# Patient Record
Sex: MALE
[De-identification: names, ages, dates, MRNs, and addresses within clinical notes are randomized per-mention and may not be internally consistent; named-entity substitution may affect disease eponyms.]

---

## 2018-01-30 ENCOUNTER — HOSPITAL ENCOUNTER (EMERGENCY)
Dept: HOSPITAL 31 - C.ER | Age: 63
Discharge: HOME | End: 2018-01-30
Payer: MEDICAID

## 2018-01-30 VITALS
DIASTOLIC BLOOD PRESSURE: 83 MMHG | HEART RATE: 68 BPM | OXYGEN SATURATION: 98 % | TEMPERATURE: 98.8 F | SYSTOLIC BLOOD PRESSURE: 153 MMHG

## 2018-01-30 VITALS — RESPIRATION RATE: 18 BRPM

## 2018-01-30 VITALS — BODY MASS INDEX: 26.6 KG/M2

## 2018-01-30 DIAGNOSIS — J40: Primary | ICD-10-CM

## 2018-01-30 LAB
ALBUMIN SERPL-MCNC: 4.2 G/DL (ref 3.5–5)
ALBUMIN/GLOB SERPL: 1.2 {RATIO} (ref 1–2.1)
ALT SERPL-CCNC: 10 U/L (ref 21–72)
AST SERPL-CCNC: 17 U/L (ref 17–59)
BACTERIA #/AREA URNS HPF: (no result) /[HPF]
BASOPHILS # BLD AUTO: 0 K/UL (ref 0–0.2)
BASOPHILS NFR BLD: 1.2 % (ref 0–2)
BILIRUB UR-MCNC: NEGATIVE MG/DL
BUN SERPL-MCNC: 17 MG/DL (ref 9–20)
CALCIUM SERPL-MCNC: 9.1 MG/DL (ref 8.6–10.4)
EOSINOPHIL # BLD AUTO: 0 K/UL (ref 0–0.7)
EOSINOPHIL NFR BLD: 0.6 % (ref 0–4)
ERYTHROCYTE [DISTWIDTH] IN BLOOD BY AUTOMATED COUNT: 13.6 % (ref 11.5–14.5)
GFR NON-AFRICAN AMERICAN: 44
GLUCOSE UR STRIP-MCNC: NORMAL MG/DL
HGB BLD-MCNC: 14.2 G/DL (ref 12–18)
HYALINE CASTS #/AREA URNS LPF: (no result) /LPF (ref 0–2)
LEUKOCYTE ESTERASE UR-ACNC: (no result) LEU/UL
LYMPHOCYTES # BLD AUTO: 1.1 K/UL (ref 1–4.3)
LYMPHOCYTES NFR BLD AUTO: 31.3 % (ref 20–40)
MCH RBC QN AUTO: 28.1 PG (ref 27–31)
MCHC RBC AUTO-ENTMCNC: 33.3 G/DL (ref 33–37)
MCV RBC AUTO: 84.2 FL (ref 80–94)
MONOCYTES # BLD: 0.7 K/UL (ref 0–0.8)
MONOCYTES NFR BLD: 20 % (ref 0–10)
NEUTROPHILS # BLD: 1.7 K/UL (ref 1.8–7)
NEUTROPHILS NFR BLD AUTO: 46.9 % (ref 50–75)
NRBC BLD AUTO-RTO: 0 % (ref 0–2)
PH UR STRIP: 5 [PH] (ref 5–8)
PLATELET # BLD: 191 K/UL (ref 130–400)
PMV BLD AUTO: 8 FL (ref 7.2–11.7)
PROT UR STRIP-MCNC: NEGATIVE MG/DL
RBC # BLD AUTO: 5.07 MIL/UL (ref 4.4–5.9)
RBC # UR STRIP: NEGATIVE /UL
SP GR UR STRIP: 1.01 (ref 1–1.03)
URINE NITRATE: NEGATIVE
UROBILINOGEN UR-MCNC: NORMAL MG/DL (ref 0.2–1)
WBC # BLD AUTO: 3.6 K/UL (ref 4.8–10.8)

## 2018-01-30 PROCEDURE — 81001 URINALYSIS AUTO W/SCOPE: CPT

## 2018-01-30 PROCEDURE — 80053 COMPREHEN METABOLIC PANEL: CPT

## 2018-01-30 PROCEDURE — 99284 EMERGENCY DEPT VISIT MOD MDM: CPT

## 2018-01-30 PROCEDURE — 85025 COMPLETE CBC W/AUTO DIFF WBC: CPT

## 2018-01-30 PROCEDURE — 84484 ASSAY OF TROPONIN QUANT: CPT

## 2018-01-30 PROCEDURE — 96374 THER/PROPH/DIAG INJ IV PUSH: CPT

## 2018-01-30 PROCEDURE — 87804 INFLUENZA ASSAY W/OPTIC: CPT

## 2018-01-30 PROCEDURE — 71046 X-RAY EXAM CHEST 2 VIEWS: CPT

## 2018-01-30 NOTE — C.PDOC
History Of Present Illness


63yo male with history of hypertension, high cholesterol, diabetes, presents to 

ED with complaints of headache, productive cough, sore throat and tactile fever 

with associated chills for the past 3 days. Patient states he took Advil for 

his symptoms with mild relief. He reports associated back pain as well. Patient 

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 

couple days ago. Patient states he "never gets sick" and has not had his flu 

shot. He denies any chest pain, shortness of breath, nausea, vomiting, diarrhea

, constipation, vision changes, ear pain. No other complaints.


Time Seen by Provider: 18 16:54


Chief Complaint (Nursing): Flu-like Symptoms


History Per: Patient


History/Exam Limitations: no limitations


Onset/Duration Of Symptoms: Days


Current Symptoms Are (Timing): Still Present


Location Of Pain: Throat, Headache


Sick Contacts (Context): Individual(s) At Work


Associated Symptoms: Fever, Chills, Sore Throat, Cough, Sputum.  denies: Nausea

, Vomiting, Diarrhea





Past Medical History


Reviewed: Historical Data, Nursing Documentation, Vital Signs


Vital Signs: 


 Last Vital Signs











Temp  97.8 F   18 14:25


 


Pulse  88   18 14:25


 


Resp  18   18 14:25


 


BP  143/86   18 14:25


 


Pulse Ox  100   18 18:32














- Medical History


PMH: Diabetes, HTN, Hyperlipidemia


Surgical History: No Surg Hx


Family History: States: No Known Family Hx





- Social History


Hx Alcohol Use: No


Hx Substance Use: No





- Immunization History


Hx Tetanus Toxoid Vaccination: No


Hx Influenza Vaccination: No


Hx Pneumococcal Vaccination: No





Review Of Systems


Except As Marked, All Systems Reviewed And Found Negative.


Constitutional: Positive for: Fever, Chills


Eyes: Negative for: Vision Change


ENT: Negative for: Nose Discharge


Respiratory: Positive for: Cough, Sputum.  Negative for: Shortness of Breath


Gastrointestinal: Negative for: Nausea, Vomiting, Abdominal Pain, Diarrhea, 

Constipation


Neurological: Positive for: Headache





Physical Exam





- Physical Exam


Appears: Non-toxic, No Acute Distress


Skin: Normal Color


Eye(s): bilateral: Normal Inspection, PERRL, EOMI


Ear(s): Bilateral: Normal


Nose: Normal


Oral Mucosa: Moist


Throat: Erythema (mild), No Exudate, No Other (edema)


Neck: Normal ROM, Supple


Chest: Symmetrical


Cardiovascular: Rhythm Regular


Respiratory: Normal Breath Sounds


Gastrointestinal/Abdominal: Normal Exam, Bowel Sounds, Soft, No Tenderness


Back: Normal Inspection


Extremity: Normal ROM


Neurological/Psych: Oriented x3, Normal Speech, Normal Cognition





ED Course And Treatment





- Laboratory Results


Result Diagrams: 


 18 17:43





 18 17:43


ECG: Interpreted By Me, Viewed By Me


ECG Rhythm: Sinus Rhythm


Interpretation Of ECG: LVH, T-Wave inversion in V5 and V6


Rate From EC


O2 Sat by Pulse Oximetry: 100 (RA)


Pulse Ox Interpretation: Normal





Medical Decision Making


Medical Decision Making: 


Impression: Viral illness


Plan:


-- Labs


-- CXR


-- Toradol 30mg IVP


-- EKG





Time: 


CXR as read by provider shows no acute disease.


Kishan reports feeling much better and is stable for discharge home. 





Disposition


Counseled Patient/Family Regarding: Studies Performed, Diagnosis, Need For 

Followup, Rx Given





- Disposition


Referrals: 


[facility] [Outside]


Disposition: HOME/ ROUTINE


Disposition Time: 18:30


Condition: STABLE


Additional Instructions: 


follow up with your doctor or medical clinic in 2 days


call to make an appointment


take medications as prescribed


return to hospital if symptoms worsens or progress 


Prescriptions: 


Azithromycin [Zithromax] 250 mg PO DAILY #4 tab


Benzonatate [Tessalon Perles] 100 mg PO BID PRN #14 sgl


 PRN Reason: Cough


Naproxen [Naprosyn] 500 mg PO BID PRN #16 tab


 PRN Reason: Pain, Moderate (4-7)


Instructions:  Acute Bronchitis (ED)


Forms:  [facility] (English), General Discharge Instructions





- Clinical Impression


Clinical Impression: 


 Bronchitis








- Scribe Statement


The provider has reviewed the documentation as recorded by the Scribe (Wendy Brewer)


Provider Attestation: 


All medical record entries made by the Scribe were at my direction and 

personally dictated by me. I have reviewed the chart and agree that the record 

accurately reflects my personal performance of the history, physical exam, 

medical decision making, and the department course for this patient. I have 

also personally directed, reviewed, and agree with the discharge instructions 

and disposition.

## 2018-01-31 NOTE — RAD
HISTORY:

SOB  



COMPARISON:

No prior.



TECHNIQUE:

Chest PA and lateral



FINDINGS:



LUNGS:

No active pulmonary disease.



PLEURA:

Left basilar pleural thickening. No significant pleural effusion 

identified. No pneumothorax apparent.



CARDIOVASCULAR:

Prior sternotomy with sternal wires and surgical clips.  

Cardiomediastinal silhouette within normal limits.



OSSEOUS STRUCTURES:

Mild degenerative changes.



VISUALIZED UPPER ABDOMEN:

Normal.



OTHER FINDINGS:

None.



IMPRESSION:

No active disease.

## 2018-02-02 NOTE — CARD
--------------- APPROVED REPORT --------------





EKG Measurement

Heart [id number]

CA [id number]

[id number]

[id number]

[id number]



<Conclusion>

Normal sinus rhythm

Moderate voltage criteria for LVH, may be normal variant

Inferior infarct, age undetermined

T wave abnormality, consider lateral ischemia

Abnormal ECG

## 2018-02-16 ENCOUNTER — HOSPITAL ENCOUNTER (EMERGENCY)
Age: 63
Discharge: HOME | End: 2018-02-16
Payer: MEDICAID

## 2018-02-16 ENCOUNTER — APPOINTMENT (OUTPATIENT)
Dept: GENERAL RADIOLOGY | Age: 63
End: 2018-02-16
Payer: MEDICAID

## 2018-02-16 VITALS
OXYGEN SATURATION: 96 % | TEMPERATURE: 98.7 F | DIASTOLIC BLOOD PRESSURE: 88 MMHG | HEART RATE: 77 BPM | SYSTOLIC BLOOD PRESSURE: 145 MMHG | HEIGHT: 69 IN | WEIGHT: 177 LBS | RESPIRATION RATE: 18 BRPM | BODY MASS INDEX: 26.22 KG/M2

## 2018-02-16 DIAGNOSIS — E11.65 TYPE 2 DIABETES MELLITUS WITH HYPERGLYCEMIA, WITHOUT LONG-TERM CURRENT USE OF INSULIN (HCC): ICD-10-CM

## 2018-02-16 DIAGNOSIS — J11.1 INFLUENZA: Primary | ICD-10-CM

## 2018-02-16 DIAGNOSIS — N18.9 CHRONIC RENAL IMPAIRMENT, UNSPECIFIED CKD STAGE: ICD-10-CM

## 2018-02-16 LAB
ALBUMIN SERPL-MCNC: 3.5 G/DL (ref 3.5–5.1)
ALP BLD-CCNC: 48 U/L (ref 38–126)
ALT SERPL-CCNC: 7 U/L (ref 11–66)
ANION GAP SERPL CALCULATED.3IONS-SCNC: 13 MEQ/L (ref 8–16)
AST SERPL-CCNC: 13 U/L (ref 5–40)
BILIRUB SERPL-MCNC: 0.2 MG/DL (ref 0.3–1.2)
BUN BLDV-MCNC: 16 MG/DL (ref 7–22)
CALCIUM SERPL-MCNC: 9 MG/DL (ref 8.5–10.5)
CHLORIDE BLD-SCNC: 97 MEQ/L (ref 98–111)
CO2: 27 MEQ/L (ref 23–33)
CREAT SERPL-MCNC: 1.8 MG/DL (ref 0.4–1.2)
FLU A ANTIGEN: POSITIVE
FLU B ANTIGEN: NEGATIVE
GFR SERPL CREATININE-BSD FRML MDRD: 46 ML/MIN/1.73M2
GLUCOSE BLD-MCNC: 250 MG/DL (ref 70–108)
LIPASE: 38.4 U/L (ref 5.6–51.3)
OSMOLALITY CALCULATION: 283.4 MOSMOL/KG (ref 275–300)
POTASSIUM SERPL-SCNC: 4.4 MEQ/L (ref 3.5–5.2)
SCAN OF BLOOD SMEAR: NORMAL
SODIUM BLD-SCNC: 137 MEQ/L (ref 135–145)
TOTAL PROTEIN: 6.9 G/DL (ref 6.1–8)

## 2018-02-16 PROCEDURE — 83690 ASSAY OF LIPASE: CPT

## 2018-02-16 PROCEDURE — 36415 COLL VENOUS BLD VENIPUNCTURE: CPT

## 2018-02-16 PROCEDURE — 80053 COMPREHEN METABOLIC PANEL: CPT

## 2018-02-16 PROCEDURE — 85025 COMPLETE CBC W/AUTO DIFF WBC: CPT

## 2018-02-16 PROCEDURE — 6370000000 HC RX 637 (ALT 250 FOR IP): Performed by: NURSE PRACTITIONER

## 2018-02-16 PROCEDURE — 71046 X-RAY EXAM CHEST 2 VIEWS: CPT

## 2018-02-16 PROCEDURE — 99284 EMERGENCY DEPT VISIT MOD MDM: CPT

## 2018-02-16 PROCEDURE — 87804 INFLUENZA ASSAY W/OPTIC: CPT

## 2018-02-16 RX ORDER — BENZONATATE 100 MG/1
100 CAPSULE ORAL 3 TIMES DAILY PRN
Qty: 30 CAPSULE | Refills: 0 | Status: SHIPPED | OUTPATIENT
Start: 2018-02-16 | End: 2018-02-23

## 2018-02-16 RX ORDER — IBUPROFEN 200 MG
400 TABLET ORAL ONCE
Status: COMPLETED | OUTPATIENT
Start: 2018-02-16 | End: 2018-02-16

## 2018-02-16 RX ORDER — BENZONATATE 100 MG/1
100 CAPSULE ORAL ONCE
Status: COMPLETED | OUTPATIENT
Start: 2018-02-16 | End: 2018-02-16

## 2018-02-16 RX ORDER — LOPERAMIDE HYDROCHLORIDE 2 MG/1
2 CAPSULE ORAL 4 TIMES DAILY PRN
Qty: 20 CAPSULE | Refills: 0 | Status: SHIPPED | OUTPATIENT
Start: 2018-02-16 | End: 2018-02-26

## 2018-02-16 RX ORDER — 0.9 % SODIUM CHLORIDE 0.9 %
1000 INTRAVENOUS SOLUTION INTRAVENOUS ONCE
Status: DISCONTINUED | OUTPATIENT
Start: 2018-02-16 | End: 2018-02-16 | Stop reason: HOSPADM

## 2018-02-16 RX ADMIN — IBUPROFEN 400 MG: 200 TABLET, FILM COATED ORAL at 14:42

## 2018-02-16 RX ADMIN — BENZONATATE 100 MG: 100 CAPSULE ORAL at 14:42

## 2018-02-16 ASSESSMENT — PAIN SCALES - GENERAL
PAINLEVEL_OUTOF10: 8
PAINLEVEL_OUTOF10: 8

## 2018-02-16 ASSESSMENT — ENCOUNTER SYMPTOMS
COUGH: 1
EYE PAIN: 1
BLOOD IN STOOL: 0
EYE REDNESS: 0
CHEST TIGHTNESS: 0
CONSTIPATION: 0
DIARRHEA: 1
VOMITING: 0
BACK PAIN: 0
SHORTNESS OF BREATH: 0
WHEEZING: 0
ABDOMINAL DISTENTION: 0
RHINORRHEA: 0
ABDOMINAL PAIN: 0
NAUSEA: 0
PHOTOPHOBIA: 0
VOICE CHANGE: 0
COLOR CHANGE: 0
SORE THROAT: 0
SINUS PRESSURE: 0

## 2018-02-16 ASSESSMENT — PAIN DESCRIPTION - DESCRIPTORS: DESCRIPTORS: SHARP

## 2018-02-16 ASSESSMENT — PAIN DESCRIPTION - PAIN TYPE: TYPE: ACUTE PAIN

## 2018-02-16 ASSESSMENT — PAIN DESCRIPTION - LOCATION: LOCATION: HEAD

## 2018-02-16 NOTE — ED NOTES
Patient presents to ED with complaint of fever, chills and headache for past few days. Patient states he took Aspirin for fever. Patient states eyes hurt with headache, room lights dimmed. Patient cough noted nonproductive. Respirations regular and unlabored. Patient further complains of some diarrhea, denies nausea or vomiting.      Cintia Bradley RN  02/16/18 [id number]

## 2018-02-17 LAB
ATYPICAL LYMPHOCYTES: ABNORMAL %
BASOPHILS # BLD: 1.3 %
BASOPHILS ABSOLUTE: 0 THOU/MM3 (ref 0–0.1)
EOSINOPHIL # BLD: 0 %
EOSINOPHILS ABSOLUTE: 0 THOU/MM3 (ref 0–0.4)
HCT VFR BLD CALC: 41.4 % (ref 42–52)
HEMOGLOBIN: 13.6 GM/DL (ref 14–18)
LYMPHOCYTES # BLD: 35.7 %
LYMPHOCYTES ABSOLUTE: 1.1 THOU/MM3 (ref 1–4.8)
MCH RBC QN AUTO: 27.7 PG (ref 27–31)
MCHC RBC AUTO-ENTMCNC: 32.8 GM/DL (ref 33–37)
MCV RBC AUTO: 84.4 FL (ref 80–94)
MONOCYTES # BLD: 25.7 %
MONOCYTES ABSOLUTE: 0.8 THOU/MM3 (ref 0.4–1.3)
NUCLEATED RED BLOOD CELLS: 0 /100 WBC
PATHOLOGIST REVIEW: ABNORMAL
PDW BLD-RTO: 13.3 % (ref 11.5–14.5)
PLATELET # BLD: 216 THOU/MM3 (ref 130–400)
PLATELET ESTIMATE: ADEQUATE
PMV BLD AUTO: 7.6 FL (ref 7.4–10.4)
RBC # BLD: 4.91 MILL/MM3 (ref 4.7–6.1)
SEG NEUTROPHILS: 37.3 %
SEGMENTED NEUTROPHILS ABSOLUTE COUNT: 1.2 THOU/MM3 (ref 1.8–7.7)
WBC # BLD: 3.1 THOU/MM3 (ref 4.8–10.8)

## 2018-02-17 NOTE — ED PROVIDER NOTES
non-plain film images(s) such as CT, Ultrasound and MRI are read by the radiologist.  Plain radiographic images are visualized and preliminarily interpreted by the emergency physician unless otherwise stated below. XR CHEST STANDARD (2 VW)   Final Result      No acute cardiopulmonary disease. **This report has been created using voice recognition software. It may contain minor errors which are inherent in voice recognition technology. **      Final report electronically signed by Dr. Sanjuana Brooks on 2/16/2018 3:08 PM            LABS:   Labs Reviewed   RAPID INFLUENZA A/B ANTIGENS - Abnormal; Notable for the following:        Result Value    Flu A Antigen POSITIVE (*)     All other components within normal limits   CBC WITH AUTO DIFFERENTIAL - Abnormal; Notable for the following:     WBC 3.1 (*)     Hemoglobin 13.6 (*)     Hematocrit 41.4 (*)     MCHC 32.8 (*)     Segs Absolute 1.2 (*)     All other components within normal limits   COMPREHENSIVE METABOLIC PANEL - Abnormal; Notable for the following:     Glucose 250 (*)     CREATININE 1.8 (*)     Chloride 97 (*)     Total Bilirubin 0.2 (*)     ALT 7 (*)     All other components within normal limits   GLOMERULAR FILTRATION RATE, ESTIMATED - Abnormal; Notable for the following:     Est, Glom Filt Rate 46 (*)     All other components within normal limits   LIPASE   ANION GAP   OSMOLALITY   SCAN OF BLOOD SMEAR       EMERGENCY DEPARTMENT COURSE:   Vitals:    Vitals:    02/16/18 1405   BP: (!) 145/88   Pulse: 77   Resp: 18   Temp: 98.7 °F (37.1 °C)   TempSrc: Oral   SpO2: 96%   Weight: 177 lb (80.3 kg)   Height: 5' 9\" (1.753 m)       MDM    Patient was seen and evaluated in the emergency department, patient appeared to be no acute distress upon my initial evaluation. Physical exam revealed no significant abnormalities. Vital signs were reviewed, slight hypertension was noted, no tachycardia or tachypnea noted. Labs are obtained, no leukocytosis is noted. Electrolytes are within normal limits, creatinine is slightly elevated for this patient. He has a history of chronic renal insufficiency. Glucose is slightly elevated as well. Influenza a is positive. I discussed my findings my plan of care the patient is amenable with discharge. We attempted to give the patient some IV fluids to help with his renal insufficiency likely caused from diarrhea. He refused this. While here in the emergency room he did maintained stable course is appropriate for discharge. He was discharged with medicines to aid and his symptomatic relief. He verbalized understanding of plan of care. Medications   benzonatate (TESSALON) capsule 100 mg (100 mg Oral Given 2/16/18 1442)   ibuprofen (ADVIL;MOTRIN) tablet 400 mg (400 mg Oral Given 2/16/18 1442)       Patient was seen independently by myself. The patient's final impression and disposition and plan was determined by myself. CRITICAL CARE:   None    CONSULTS:  None    PROCEDURES:  None    FINAL IMPRESSION      1. Influenza    2. Chronic renal impairment, unspecified CKD stage    3.  Type 2 diabetes mellitus with hyperglycemia, without long-term current use of insulin [facility]          DISPOSITION/PLAN   Patient discharged in stable condition    PATIENT REFERRED TO:  Your [facility] [address] [facility]    Call in 3 days  For follow up and evaluation      DISCHARGE MEDICATIONS:  Discharge Medication List as of 2/16/2018  4:06 PM      START taking these medications    Details   loperamide (RA ANTI-DIARRHEAL) 2 MG capsule Take 1 capsule by mouth 4 times daily as needed for Diarrhea, Disp-20 capsule, R-0Print      benzonatate (TESSALON PERLES) 100 MG capsule Take 1 capsule by mouth 3 times daily as needed for Cough, Disp-30 capsule, R-0Print             (Please note that portions of this note were completed with a voice recognition program.  Efforts were made to edit the dictations but occasionally words are mis-transcribed.)    Provider:  I personally

## 2018-11-08 ENCOUNTER — HOSPITAL ENCOUNTER (INPATIENT)
Age: 63
LOS: 7 days | Discharge: HOME | DRG: 751 | End: 2018-11-15
Attending: FAMILY MEDICINE | Admitting: PSYCHIATRY & NEUROLOGY
Payer: MEDICAID

## 2018-11-08 DIAGNOSIS — R45.850 HOMICIDAL IDEATIONS: ICD-10-CM

## 2018-11-08 DIAGNOSIS — F43.20 ADJUSTMENT DISORDER, UNSPECIFIED TYPE: Primary | ICD-10-CM

## 2018-11-08 LAB
ACETAMINOPHEN LEVEL: < 5 UG/ML (ref 0–20)
AMPHETAMINE+METHAMPHETAMINE URINE SCREEN: NEGATIVE
ANION GAP SERPL CALCULATED.3IONS-SCNC: 12 MEQ/L (ref 8–16)
BACTERIA: ABNORMAL
BARBITURATE QUANTITATIVE URINE: NEGATIVE
BASOPHILS # BLD: 0.4 %
BASOPHILS ABSOLUTE: 0 THOU/MM3 (ref 0–0.1)
BENZODIAZEPINE QUANTITATIVE URINE: NEGATIVE
BILIRUBIN URINE: ABNORMAL
BLOOD, URINE: NEGATIVE
BUN BLDV-MCNC: 13 MG/DL (ref 7–22)
CALCIUM SERPL-MCNC: 9.1 MG/DL (ref 8.5–10.5)
CANNABINOID QUANTITATIVE URINE: NEGATIVE
CASTS: ABNORMAL /LPF
CASTS: ABNORMAL /LPF
CHARACTER, URINE: CLEAR
CHLORIDE BLD-SCNC: 102 MEQ/L (ref 98–111)
CO2: 25 MEQ/L (ref 23–33)
COCAINE METABOLITE QUANTITATIVE URINE: POSITIVE
COLOR: YELLOW
CREAT SERPL-MCNC: 1.3 MG/DL (ref 0.4–1.2)
CRYSTALS: ABNORMAL
EOSINOPHIL # BLD: 2.7 %
EOSINOPHILS ABSOLUTE: 0.2 THOU/MM3 (ref 0–0.4)
EPITHELIAL CELLS, UA: ABNORMAL /HPF
ERYTHROCYTE [DISTWIDTH] IN BLOOD BY AUTOMATED COUNT: 12.7 % (ref 11.5–14.5)
ERYTHROCYTE [DISTWIDTH] IN BLOOD BY AUTOMATED COUNT: 39.7 FL (ref 35–45)
ETHYL ALCOHOL, SERUM: < 0.01 %
GFR SERPL CREATININE-BSD FRML MDRD: 67 ML/MIN/1.73M2
GLUCOSE BLD-MCNC: 229 MG/DL (ref 70–108)
GLUCOSE, URINE: 100 MG/DL
HCT VFR BLD CALC: 43.6 % (ref 42–52)
HEMOGLOBIN: 14.1 GM/DL (ref 14–18)
ICTOTEST: NEGATIVE
IMMATURE GRANS (ABS): 0.01 THOU/MM3 (ref 0–0.07)
IMMATURE GRANULOCYTES: 0.2 %
KETONES, URINE: NEGATIVE
LEUKOCYTE ESTERASE, URINE: NEGATIVE
LYMPHOCYTES # BLD: 29.7 %
LYMPHOCYTES ABSOLUTE: 1.7 THOU/MM3 (ref 1–4.8)
MCH RBC QN AUTO: 27.7 PG (ref 26–33)
MCHC RBC AUTO-ENTMCNC: 32.3 GM/DL (ref 32.2–35.5)
MCV RBC AUTO: 85.7 FL (ref 80–94)
MISCELLANEOUS LAB TEST RESULT: ABNORMAL
MONOCYTES # BLD: 8.3 %
MONOCYTES ABSOLUTE: 0.5 THOU/MM3 (ref 0.4–1.3)
NITRITE, URINE: NEGATIVE
NUCLEATED RED BLOOD CELLS: 0 /100 WBC
OPIATES, URINE: NEGATIVE
OSMOLALITY CALCULATION: 284.9 MOSMOL/KG (ref 275–300)
OXYCODONE: NEGATIVE
PH UA: 5.5
PHENCYCLIDINE QUANTITATIVE URINE: NEGATIVE
PLATELET # BLD: 219 THOU/MM3 (ref 130–400)
PMV BLD AUTO: 9.6 FL (ref 9.4–12.4)
POTASSIUM SERPL-SCNC: 4 MEQ/L (ref 3.5–5.2)
PROTEIN UA: ABNORMAL MG/DL
RBC # BLD: 5.09 MILL/MM3 (ref 4.7–6.1)
RBC URINE: ABNORMAL /HPF
RENAL EPITHELIAL, UA: ABNORMAL
SALICYLATE, SERUM: < 0.3 MG/DL (ref 2–10)
SEG NEUTROPHILS: 58.7 %
SEGMENTED NEUTROPHILS ABSOLUTE COUNT: 3.3 THOU/MM3 (ref 1.8–7.7)
SODIUM BLD-SCNC: 139 MEQ/L (ref 135–145)
SPECIFIC GRAVITY UA: 1.02 (ref 1–1.03)
T4 FREE: 1.2 NG/DL (ref 0.93–1.76)
TSH SERPL DL<=0.05 MIU/L-ACNC: 0.12 UIU/ML (ref 0.4–4.2)
UROBILINOGEN, URINE: 1 EU/DL
WBC # BLD: 5.6 THOU/MM3 (ref 4.8–10.8)
WBC UA: ABNORMAL /HPF
YEAST: ABNORMAL

## 2018-11-08 PROCEDURE — 85025 COMPLETE CBC W/AUTO DIFF WBC: CPT

## 2018-11-08 PROCEDURE — 84443 ASSAY THYROID STIM HORMONE: CPT

## 2018-11-08 PROCEDURE — 84439 ASSAY OF FREE THYROXINE: CPT

## 2018-11-08 PROCEDURE — G0480 DRUG TEST DEF 1-7 CLASSES: HCPCS

## 2018-11-08 PROCEDURE — 99285 EMERGENCY DEPT VISIT HI MDM: CPT

## 2018-11-08 PROCEDURE — 36415 COLL VENOUS BLD VENIPUNCTURE: CPT

## 2018-11-08 PROCEDURE — 1240000000 HC EMOTIONAL WELLNESS R&B

## 2018-11-08 PROCEDURE — 81001 URINALYSIS AUTO W/SCOPE: CPT

## 2018-11-08 PROCEDURE — 80048 BASIC METABOLIC PNL TOTAL CA: CPT

## 2018-11-08 PROCEDURE — 80307 DRUG TEST PRSMV CHEM ANLYZR: CPT

## 2018-11-08 RX ORDER — HYDROXYZINE PAMOATE 50 MG/1
50 CAPSULE ORAL 3 TIMES DAILY PRN
Status: DISCONTINUED | OUTPATIENT
Start: 2018-11-08 | End: 2018-11-16 | Stop reason: HOSPADM

## 2018-11-08 RX ORDER — ACETAMINOPHEN 325 MG/1
650 TABLET ORAL EVERY 4 HOURS PRN
Status: DISCONTINUED | OUTPATIENT
Start: 2018-11-08 | End: 2018-11-16 | Stop reason: HOSPADM

## 2018-11-08 RX ORDER — TRAZODONE HYDROCHLORIDE 50 MG/1
50 TABLET ORAL NIGHTLY PRN
Status: DISCONTINUED | OUTPATIENT
Start: 2018-11-08 | End: 2018-11-12

## 2018-11-08 RX ORDER — IBUPROFEN 400 MG/1
400 TABLET ORAL EVERY 6 HOURS PRN
Status: DISCONTINUED | OUTPATIENT
Start: 2018-11-08 | End: 2018-11-16 | Stop reason: HOSPADM

## 2018-11-08 ASSESSMENT — ENCOUNTER SYMPTOMS
BLOOD IN STOOL: 0
NAUSEA: 0
RHINORRHEA: 0
BACK PAIN: 0
WHEEZING: 0
SORE THROAT: 0
CONSTIPATION: 0
VOMITING: 0
SHORTNESS OF BREATH: 0
DIARRHEA: 0
COUGH: 0
ABDOMINAL PAIN: 0
CHEST TIGHTNESS: 0

## 2018-11-08 ASSESSMENT — SLEEP AND FATIGUE QUESTIONNAIRES
DIFFICULTY ARISING: NO
RESTFUL SLEEP: NO
SLEEP PATTERN: DIFFICULTY FALLING ASLEEP;DISTURBED/INTERRUPTED SLEEP;INSOMNIA
DO YOU HAVE DIFFICULTY SLEEPING: YES
DIFFICULTY FALLING ASLEEP: YES
DO YOU USE A SLEEP AID: NO
DIFFICULTY STAYING ASLEEP: YES

## 2018-11-08 ASSESSMENT — PATIENT HEALTH QUESTIONNAIRE - PHQ9: SUM OF ALL RESPONSES TO PHQ QUESTIONS 1-9: 15

## 2018-11-08 NOTE — PROGRESS NOTES
addressing dangers of smoking, developing coping skills, and providing basic information about quitting. Pt response to counseling:  refused        Pt came to our ED homicidal with no plan after he found out his wife and son were sleeping together, pt relapsed on crack cocaine, has hx of Bipolar disorder, no meds or treatment for a long tome, last here in 2014, refused admission to the unit.     Sam Tyson RN

## 2018-11-08 NOTE — ED PROVIDER NOTES
CONSULTS:  DARIUS    PROCEDURES:  None     FINAL IMPRESSION      1. Adjustment disorder, unspecified type    2. Homicidal ideations          DISPOSITION/PLAN   Admit    PATIENT REFERRED TO:  No follow-up provider specified. DISCHARGE MEDICATIONS:  Current Discharge Medication List          (Please note that portions of this note were completed with a voice recognition program.  Efforts were made to edit the dictations but occasionally words are mis-transcribed.)    Scribe:  Shirin Fontenot 11/8/18 10:55 AM Scribing for and in the presence of Nick Farnsworth MD.    Signed by: Tegan Mchugh, 11/08/18 8:46 PM    Provider:  I personally performed the services described in the documentation, reviewed and edited the documentation which was dictated to the scribe in my presence, and it accurately records my words and actions.     Nick Farnsworth MD 11/8/18 8:46 PM       Nick Farnsworth MD  11/08/18 0749

## 2018-11-09 PROCEDURE — 1240000000 HC EMOTIONAL WELLNESS R&B

## 2018-11-09 PROCEDURE — 6370000000 HC RX 637 (ALT 250 FOR IP): Performed by: PSYCHIATRY & NEUROLOGY

## 2018-11-09 RX ADMIN — HYDROXYZINE PAMOATE 50 MG: 50 CAPSULE ORAL at 11:07

## 2018-11-09 RX ADMIN — TRAZODONE HYDROCHLORIDE 50 MG: 50 TABLET ORAL at 22:03

## 2018-11-09 NOTE — BH NOTE
INPATIENT RECREATIONAL THERAPY  ADULT BEHAVIORAL SERVICES  EVALUATION    REFERRING PHYSICIAN:   Dr. Nadia Neely  DIAGNOSIS:   Adjustment Disorder  PRECAUTIONS:   Homicide precautions    HISTORY OF PRESENT ILLNESS/INJURY:   Patient was admitted to the unit due to homicidal ideation. Patient reported that he was having thoughts of killing his wife and her son prior to admission. Patient stated that his wife and her son are sleeping together. Patient reported that he had to leave his home due to this and now he is homeless. Patient was uncooperative and refusing to participate in evaluation at this time so all information is taken from observation of the patient and also from his chart. PMH:  Please see medical chart for prior medical history, allergies, and medicatio    HISTORY OF PSYCHIATRIC TREATMENT:  [facility]  [facility]  DATE OF BIRTH:   [birth year]  GENDER:   male  MARITAL STATUS:        EMPLOYMENT STATUS:   [employer]  LIVING SITUATION/SUPPORT:  Patient reported that he is homeless. EDUCATIONAL LEVEL:    [employer]    MEDICATION/DRUG USE:  Overdose in the past.  History of alcohol abuse. History of crack cocaine abuse. Marijuana use. History of noncompliance with medications. LEISURE INTERESTS:   Spiritual activities, sports  ACTIVITY PREFERENCE:   Individual at this time  ACTIVITY TYPES:  MALLORY  COGNITION:  MALLORY    COPING:   poor  ATTENTION:  Fair to poor concentration  RELAXATION:  Appeared anxious and restless AEB pacing on the unit. SELF-ESTEEM:   MALLORY  MOTIVATION:   poor    SOCIAL SKILLS:   Poor - patient is mute and refusing to answer questions. FRUSTRATION TOLERANCE:   Patient was reported to have a history of being argumentative and appeared irritable. ATTENTION SEEKING:    Patient is mute at this time. COOPERATION:   Uncooperative, guarded and dismissive.   AFFECT:  flat  APPEARANCE:    appropriate    HEARING:   No problems noted  VISION:   No problems noted   VERBAL COMMUNICATION:   Patient is mute at

## 2018-11-09 NOTE — PROGRESS NOTES
[facility] Biopsychosocial Assessment    Current Level of Psychosocial Functioning     Independent   Dependent      XXX  Minimal Assist     Comments:      Psychosocial High Risk Factors (check all that apply)    Unable to obtain meds   Chronic illness/pain    Substance abuse    XXX  Lack of Family Support   XXX  Financial stress    XXX  Isolation     XXX  Inadequate Community Resources XXX  Suicide attempt(s)  Not taking medications  XXX  Victim of crime   Developmental Delay  Unable to manage personal needs    Age 72 or older   Homeless   XXX  No transportation   Readmission within 30 days  Unemployment  Traumatic Event    Comments:   Sexual Orientation:Hetrosexual     Patient Strengths: High school graduate    Patient Barriers: Housing, relational, recent substance abuse, unwilling to engage in treatment process. Plan of Care     medication management, group/individual therapies, family meetings, psycho -education, treatment team meetings to assist with stabilization    Initial Discharge Plan:  On going      Clinical Summary:  Unable to assess pt as he continues to refuse to speak with me. Review of records completed. Duty to warn completed in the [facility]FF[facility]IATE OF [facility]. Pt does state to THIERRY Castro that he intends to go to [place] where he has a sister.

## 2018-11-09 NOTE — PLAN OF CARE
Problem: KNOWLEDGE DEFICIT  Goal: Knowledge - personal safety  Outcome: Met This Shift  Patient remains safe and free from harm. Safety and suicide precautions in place. Problem: Discharge Planning:  Goal: Discharged to appropriate level of care  Discharged to appropriate level of care   Outcome: Ongoing  Patient will be discharged to appropriate level of care    Problem: Anger Management/Homicidal Ideation:  Goal: Able to display appropriate communication and problem solving  Able to display appropriate communication and problem solving   Outcome: Ongoing  Uncooperative with staff for assessment. Refused to answer questions. At first, patient refused to go to the office to talk to Dr. Rosa Edwards, then he did go to the office but was uncooperative     Goal: Absence of homicidal ideation  Absence of homicidal ideation   Outcome: Not Met This Shift  Unable to assess. Patient refused to answer questions  Goal: Participates in care planning  Participates in care planning   Outcome: Ongoing  Refused to participate  Goal: Patient specific goal  Patient specific goal   Outcome: Not Met This Shift  No goal set    Problem: Cardiac Output - Decreased:  Goal: Hemodynamic stability will improve  Hemodynamic stability will improve   Outcome: Ongoing  Refused vital signs this shift    Comments: Care plan reviewed with patient. Patient does not verbalize understanding of the plan of care and does not contribute to goal setting.

## 2018-11-09 NOTE — PROGRESS NOTES
[address] [facility]  Initial Interdisciplinary Treatment Plan NOTE    Review Date & Time: 11/09/18 0900    Patient was in treatment team    Admission Type:   Admission Type: Involuntary    Reason for admission:  Reason for Admission: adjustment disorder      Estimated Length of Stay Update:  11/11/18  Estimated Discharge Date Update: 11/13/18    PATIENT STRENGTHS:  Patient Strengths Strengths: Social Skills  Patient Strengths and Limitations:Limitations: General negative or hopeless attitude about future/recovery  Addictive Behavior:   Medical Problems:  Past Medical History:   Diagnosis Date    Depression     Diabetes mellitus ([facility]     Hyperlipidemia     Hypertension        EDUCATION:   Learner Progress Toward Treatment Goals: None    Method: Small group    Outcome: Refused Education    PATIENT GOALS: Pt refused to participate    PLAN/TREATMENT RECOMMENDATIONS UPDATE:   1. What is the most important thing we can help you with while you are here? Pt refuses to participate in the meeting or interview with the Psychiatrist.  2. Who is your support system? Unknown  3. Do you have follow-up providers? Unknown  4. Do you have the ability to pay for your medications? None known  5. Where will you be residing when you leave the hospital? Unknown  6. What is a phone # we may contact you at?  Unknown      GOALS UPDATE:   Time frame for Short-Term Goals: Daily    YANETH Hagen

## 2018-11-09 NOTE — PLAN OF CARE
Problem: KNOWLEDGE DEFICIT  Goal: Knowledge - personal safety  Outcome: Ongoing  Education provided on benefits and purpose of completing personal safety plan before discharge. Pt did not verbalize understanding but walked away from staff. Problem: Discharge Planning:  Goal: Discharged to appropriate level of care  Discharged to appropriate level of care   Outcome: Ongoing  Attempting to work with pt to achieve discharge needs. He is uncooperative with staff    Problem: Anger Management/Homicidal Ideation:  Goal: Able to display appropriate communication and problem solving  Able to display appropriate communication and problem solving   Outcome: Ongoing  Pt is nonverbal, refusing to cooperate with staff. Goal: Absence of homicidal ideation  Absence of homicidal ideation   Outcome: Ongoing  Unable to assess. Pt remains nonverbal and will not answer assessment questions. Goal: Patient specific goal  Patient specific goal     Outcome: Ongoing  Pt refused to establish a short term goal for recovery although pt was encouraged to attend groups he refused to participate in group    Problem: Cardiac Output - Decreased:  Goal: Hemodynamic stability will improve  Hemodynamic stability will improve   Outcome: Ongoing  Pt refused to have his vital signs taken. He has not complained of chest pain-will continue to monitor    Problem: Substance Abuse:  Goal: Patient specific goal  Patient specific goal     Outcome: Ongoing  Pt refused to establish a short term goal for recovery although pt was encouraged to attend groups he refused to participate in group    Comments: Care plan reviewed with patient. Patient does not verbalize understanding of the plan of care and did not contribute to goal setting.

## 2018-11-10 PROCEDURE — 6370000000 HC RX 637 (ALT 250 FOR IP): Performed by: PSYCHIATRY & NEUROLOGY

## 2018-11-10 PROCEDURE — 1240000000 HC EMOTIONAL WELLNESS R&B

## 2018-11-10 RX ORDER — LORAZEPAM 1 MG/1
1 TABLET ORAL EVERY 6 HOURS PRN
Status: DISCONTINUED | OUTPATIENT
Start: 2018-11-10 | End: 2018-11-16 | Stop reason: HOSPADM

## 2018-11-10 RX ORDER — LORAZEPAM 2 MG/ML
1 INJECTION INTRAMUSCULAR EVERY 6 HOURS PRN
Status: DISCONTINUED | OUTPATIENT
Start: 2018-11-10 | End: 2018-11-16 | Stop reason: HOSPADM

## 2018-11-10 RX ADMIN — LORAZEPAM 1 MG: 1 TABLET ORAL at 20:41

## 2018-11-10 RX ADMIN — HYDROXYZINE PAMOATE 50 MG: 50 CAPSULE ORAL at 10:29

## 2018-11-10 RX ADMIN — LORAZEPAM 1 MG: 1 TABLET ORAL at 11:48

## 2018-11-10 ASSESSMENT — PAIN SCALES - GENERAL
PAINLEVEL_OUTOF10: 0
PAINLEVEL_OUTOF10: 0

## 2018-11-10 NOTE — PROGRESS NOTES
Lymphocytes 11/08/2018 29.7  % Final    Monocytes 11/08/2018 8.3  % Final    Eosinophils 11/08/2018 2.7  % Final    Basophils 11/08/2018 0.4  % Final    Immature Granulocytes 11/08/2018 0.2  % Final    Segs Absolute 11/08/2018 3.3  1.8 - 7.7 thou/mm3 Final    Lymphocytes # 11/08/2018 1.7  1.0 - 4.8 thou/mm3 Final    Monocytes # 11/08/2018 0.5  0.4 - 1.3 thou/mm3 Final    Eosinophils # 11/08/2018 0.2  0.0 - 0.4 thou/mm3 Final    Basophils # 11/08/2018 0.0  0.0 - 0.1 thou/mm3 Final    Immature Grans (Abs) 11/08/2018 0.01  0.00 - 0.07 thou/mm3 Final    nRBC 11/08/2018 0  /100 wbc Final    Performed at [address] [place] [address], [address]    Sodium 11/08/2018 139  135 - 145 meq/L Final    Potassium 11/08/2018 4.0  3.5 - 5.2 meq/L Final    Chloride 11/08/2018 102  98 - 111 meq/L Final    CO2 11/08/2018 25  23 - 33 meq/L Final    Glucose 11/08/2018 229* 70 - 108 mg/dL Final    BUN 11/08/2018 13  7 - 22 mg/dL Final    CREATININE 11/08/2018 1.3* 0.4 - 1.2 mg/dL Final    Calcium 11/08/2018 9.1  8.5 - 10.5 mg/dL Final    Performed at [address] [place] [address], [address], SERUM 11/08/2018 < 0.01  0.00 % Final    Performed at [address] [place] [address], [address]    Salicylate, Serum 97/80/1124 < 0.3* 2.0 - 10.0 mg/dL Final    Performed at [address] [place] [address], [address]    Acetaminophen Level 11/08/2018 < 5.0  0.0 - 20.0 ug/mL Final    Performed at [address] [place] [address], [facility]    Glucose, Urine 11/08/2018 100* NEGATIVE mg/dl Final    Bilirubin Urine 11/08/2018 SMALL* NEGATIVE Final    Ketones, Urine 11/08/2018 NEGATIVE  NEGATIVE Final    Specific [place], UA 11/08/2018 1.023  1.002 - 1.03 Final    Blood, Urine 11/08/2018 NEGATIVE  NEGATIVE Final    pH, UA 11/08/2018 5.5  5.0 - 9.0 Final    Protein, UA 11/08/2018 TRACE* NEGATIVE mg/dl Final    Urobilinogen, Urine 11/08/2018 1.0 tablet 400 mg, 400 mg, Oral, Q6H PRN  hydrOXYzine (VISTARIL) capsule 50 mg, 50 mg, Oral, TID PRN  magnesium hydroxide (MILK OF MAGNESIA) 400 MG/5ML suspension 30 mL, 30 mL, Oral, Daily PRN  traZODone (DESYREL) tablet 50 mg, 50 mg, Oral, Nightly PRN    ASSESSMENT  <principal problem not specified>     PLAN  Patient s symptoms   show no change  Continue with current prn medications. Attempt to develop insight  Psycho-education conducted. Supportive Therapy conducted.

## 2018-11-10 NOTE — PLAN OF CARE
Problem: KNOWLEDGE DEFICIT  Goal: Knowledge - personal safety  Outcome: Ongoing  Patient maintained in a safe environment. 15 minute visual checks continued. Problem: Discharge Planning:  Goal: Discharged to appropriate level of care  Discharged to appropriate level of care   Outcome: Ongoing  Patient states he is planning to stay with his sister at discharge. Problem: Anger Management/Homicidal Ideation:  Goal: Able to display appropriate communication and problem solving  Able to display appropriate communication and problem solving   Outcome: Ongoing  Patient mumbles his answers or nods yes or no. Goal: Absence of homicidal ideation  Absence of homicidal ideation   Outcome: Ongoing  None verbalized this shift. Goal: Patient specific goal  Patient specific goal     Outcome: Ongoing  Patient did not verbalize a goal this shift. Problem: Cardiac Output - Decreased:  Goal: Hemodynamic stability will improve  Hemodynamic stability will improve   Outcome: Ongoing  Vital signs are within normal limits. Problem: Substance Abuse:  Goal: Patient specific goal  Patient specific goal     Outcome: Ongoing  Patient did not verbalize a goal this shift. Goal: Participates in care planning  Participates in care planning   Outcome: Ongoing  Care plan reviewed with patient and limited understanding verbalized. Problem: Activity:  Goal: Sleeping patterns will improve  Sleeping patterns will improve   Outcome: Ongoing  Patient noted resting quietly during the night. Comments: Care plan reviewed with patient.   Patient does not verbalize understanding of the plan of care and does not contribute to goal setting

## 2018-11-11 PROBLEM — F32.2 MDD (MAJOR DEPRESSIVE DISORDER), SINGLE EPISODE, SEVERE , NO PSYCHOSIS (HCC): Status: ACTIVE | Noted: 2018-11-11

## 2018-11-11 PROCEDURE — 6370000000 HC RX 637 (ALT 250 FOR IP): Performed by: PSYCHIATRY & NEUROLOGY

## 2018-11-11 PROCEDURE — 1240000000 HC EMOTIONAL WELLNESS R&B

## 2018-11-11 RX ORDER — SERTRALINE HYDROCHLORIDE 100 MG/1
100 TABLET, FILM COATED ORAL DAILY
Status: DISCONTINUED | OUTPATIENT
Start: 2018-11-11 | End: 2018-11-13

## 2018-11-11 RX ORDER — QUETIAPINE FUMARATE 100 MG/1
100 TABLET, FILM COATED ORAL NIGHTLY
Status: DISCONTINUED | OUTPATIENT
Start: 2018-11-11 | End: 2018-11-16 | Stop reason: HOSPADM

## 2018-11-11 RX ORDER — ASPIRIN 81 MG/1
81 TABLET, CHEWABLE ORAL DAILY
Status: DISCONTINUED | OUTPATIENT
Start: 2018-11-11 | End: 2018-11-16 | Stop reason: HOSPADM

## 2018-11-11 RX ORDER — LISINOPRIL 10 MG/1
10 TABLET ORAL DAILY
Status: DISCONTINUED | OUTPATIENT
Start: 2018-11-11 | End: 2018-11-16 | Stop reason: HOSPADM

## 2018-11-11 RX ORDER — FAMOTIDINE 20 MG/1
20 TABLET, FILM COATED ORAL 2 TIMES DAILY
Status: DISCONTINUED | OUTPATIENT
Start: 2018-11-11 | End: 2018-11-16 | Stop reason: HOSPADM

## 2018-11-11 RX ORDER — GLYBURIDE 5 MG/1
5 TABLET ORAL 2 TIMES DAILY WITH MEALS
Status: DISCONTINUED | OUTPATIENT
Start: 2018-11-11 | End: 2018-11-16 | Stop reason: HOSPADM

## 2018-11-11 RX ADMIN — LINAGLIPTIN 5 MG: 5 TABLET, FILM COATED ORAL at 11:28

## 2018-11-11 RX ADMIN — FAMOTIDINE 20 MG: 20 TABLET ORAL at 20:27

## 2018-11-11 RX ADMIN — HYDROXYZINE PAMOATE 50 MG: 50 CAPSULE ORAL at 08:22

## 2018-11-11 RX ADMIN — TRAZODONE HYDROCHLORIDE 50 MG: 50 TABLET ORAL at 20:27

## 2018-11-11 RX ADMIN — QUETIAPINE FUMARATE 100 MG: 100 TABLET ORAL at 20:27

## 2018-11-11 RX ADMIN — ASPIRIN 81 MG 81 MG: 81 TABLET ORAL at 11:28

## 2018-11-11 RX ADMIN — SERTRALINE 100 MG: 100 TABLET, FILM COATED ORAL at 11:28

## 2018-11-11 RX ADMIN — LISINOPRIL 10 MG: 10 TABLET ORAL at 11:28

## 2018-11-11 RX ADMIN — FAMOTIDINE 20 MG: 20 TABLET ORAL at 11:28

## 2018-11-11 ASSESSMENT — PAIN SCALES - GENERAL
PAINLEVEL_OUTOF10: 0
PAINLEVEL_OUTOF10: 0

## 2018-11-11 NOTE — BH NOTE
Patient refused chem stick before supper saying \"I don't need it\" and refused his 1700 dose of Glyburide. He ask about getting \"small white pill\" for anxiety, educate that Vistaril capsule is ordered for anxiety, he refused to take Vistaril  He later came up and asked about \"can I sign myself out\", redirected to discuss with Dr. Kailey Harper in morning. He is irritable, dismissive, and uncooperative. He has been out on fringe of peers no interaction noted or isolative to his room and bed most of the day. He says all he wants is \"little white pill to make me sleep, sleep, sleep\". Attempted to educate that if he sleeps all day he will not sleep at bedtime. He is dismissive and walks away.

## 2018-11-11 NOTE — BH NOTE
Pt declined to attend 0900 Community Meeting and Goal group therapy session. Pt provided verbal encouragement to attend, pt remained resting in bed during time of invitation.

## 2018-11-11 NOTE — PLAN OF CARE
Problem: KNOWLEDGE DEFICIT  Intervention: Provide a safe environment  Maintain safe environment. Goal: Knowledge - personal safety  Outcome: Ongoing  Patient has not started working toward completing his safety plan and their recovery program and resiliency, will address when he is more compliant. Intervention: Education, safety plan  Not started at this time, will engage with patient when he is more compliant and engaged in his treatment. Problem: Discharge Planning:  Intervention: Assess readiness for discharge  MALLORY, patient refuses to answer assessment questions. Goal: Discharged to appropriate level of care  Discharged to appropriate level of care   Outcome: Ongoing  Working with multidisciplinary treatment team for ongoing continuity of care and relapse prevention. Problem: Anger Management/Homicidal Ideation:  Goal: Able to display appropriate communication and problem solving  Able to display appropriate communication and problem solving   Outcome: Ongoing  Patient is irritable, dismissive, avoids eye contact, looks away, and refuses to answer assessment questions. He yelled at this nurse:  \"I'm in the hospital\" when attempting to assess a/o to person, place, date, and situation. He then refused to answer any other questions. Goal: Absence of homicidal ideation  Absence of homicidal ideation   Outcome: Ongoing  He shakes head \"no\" when asked if he wanted to hurt self and or others. Goal: Patient specific goal  Patient specific goal     Outcome: Not Met This Shift  No goal set for today, dismissive and irritable and refuses to engage in conversation with this nurse. Problem: Cardiac Output - Decreased:  Intervention: Monitor hemodynamic parameters  Vital signs stable. Intervention: Medication management  Patient not on any medications except PRN at this time.     Goal: Hemodynamic stability will improve  Hemodynamic stability will improve   Outcome: Ongoing  Vital signs remain

## 2018-11-11 NOTE — BH NOTE
Group Therapy Note    Date: 11/11/2018  Start Time: 8129  End Time:  2673  Number of Participants: 8    Type of Group: Psychoeducation      Group Goal: Increase insight into depression, dual diagnosis of addiction, suicide prevention, medications, need for support, and relapse prevention. We discussed benefits of counseling too. Notes:  Asked each patient to give one thing they are thankful for today and to rate mood on 1-10 with 10 the happiest      Patient refused to attend and participate in this afternoon group.     Status After Intervention: n/a    Participation Level: n/a    Participation Quality: n/a      Speech:  n/a      Thought Process/Content: n/a      Affective Functioning: n/a      Mood: n/a    Level of consciousness: n/a      Response to Learning: n/a    Endings:n/a    Modes of Intervention: Education, Support and Socialization      Discipline Responsible: Registered Nurse       Signature:  MITCHEL Pagan RN MSN

## 2018-11-11 NOTE — BH NOTE
Group Therapy Note    Date: 11/10/2018  Start Time: 1630  End Time:  1700  Number of Participants: 5    Type of Group: Healthy Living/Wellness    Notes:  Patient did not attend groups.     Discipline Responsible: Licensed Practical Nurse    Signature:  Francisco Romano LPN

## 2018-11-12 PROBLEM — F14.20 COCAINE USE DISORDER, MODERATE, DEPENDENCE (HCC): Status: ACTIVE | Noted: 2018-11-12

## 2018-11-12 PROCEDURE — 1240000000 HC EMOTIONAL WELLNESS R&B

## 2018-11-12 PROCEDURE — 6370000000 HC RX 637 (ALT 250 FOR IP): Performed by: PSYCHIATRY & NEUROLOGY

## 2018-11-12 RX ORDER — TRAZODONE HYDROCHLORIDE 100 MG/1
100 TABLET ORAL NIGHTLY PRN
Status: DISCONTINUED | OUTPATIENT
Start: 2018-11-12 | End: 2018-11-16 | Stop reason: HOSPADM

## 2018-11-12 RX ADMIN — GLYBURIDE 5 MG: 5 TABLET ORAL at 17:15

## 2018-11-12 RX ADMIN — QUETIAPINE FUMARATE 100 MG: 100 TABLET ORAL at 19:54

## 2018-11-12 RX ADMIN — LISINOPRIL 10 MG: 10 TABLET ORAL at 08:30

## 2018-11-12 RX ADMIN — LINAGLIPTIN 5 MG: 5 TABLET, FILM COATED ORAL at 08:30

## 2018-11-12 RX ADMIN — FAMOTIDINE 20 MG: 20 TABLET ORAL at 08:30

## 2018-11-12 RX ADMIN — FAMOTIDINE 20 MG: 20 TABLET ORAL at 20:21

## 2018-11-12 RX ADMIN — ASPIRIN 81 MG 81 MG: 81 TABLET ORAL at 08:30

## 2018-11-12 RX ADMIN — SERTRALINE 100 MG: 100 TABLET, FILM COATED ORAL at 08:30

## 2018-11-12 RX ADMIN — GLYBURIDE 5 MG: 5 TABLET ORAL at 08:30

## 2018-11-12 ASSESSMENT — PAIN SCALES - GENERAL: PAINLEVEL_OUTOF10: 0
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
## 2018-11-12 NOTE — PROGRESS NOTES
(MILK OF MAGNESIA) 400 MG/5ML suspension 30 mL, 30 mL, Oral, Daily PRN  traZODone (DESYREL) tablet 50 mg, 50 mg, Oral, Nightly PRN    ASSESSMENT  MDD (major depressive disorder), single episode, severe , no psychosis [facility]     PLAN  Patient s symptoms are improving some  Continue with current prn medications. Increase Trazodone  Attempt to develop insight  Psycho-education conducted. Supportive Therapy conducted.

## 2018-11-13 PROBLEM — F33.2 MDD (MAJOR DEPRESSIVE DISORDER), RECURRENT SEVERE, WITHOUT PSYCHOSIS (HCC): Status: ACTIVE | Noted: 2018-11-11

## 2018-11-13 PROCEDURE — 1240000000 HC EMOTIONAL WELLNESS R&B

## 2018-11-13 PROCEDURE — 6370000000 HC RX 637 (ALT 250 FOR IP): Performed by: PSYCHIATRY & NEUROLOGY

## 2018-11-13 RX ORDER — POLYETHYLENE GLYCOL 3350 17 G
2 POWDER IN PACKET (EA) ORAL PRN
Status: DISCONTINUED | OUTPATIENT
Start: 2018-11-13 | End: 2018-11-16 | Stop reason: HOSPADM

## 2018-11-13 RX ORDER — SERTRALINE HYDROCHLORIDE 100 MG/1
200 TABLET, FILM COATED ORAL DAILY
Status: DISCONTINUED | OUTPATIENT
Start: 2018-11-13 | End: 2018-11-16 | Stop reason: HOSPADM

## 2018-11-13 RX ADMIN — LISINOPRIL 10 MG: 10 TABLET ORAL at 08:27

## 2018-11-13 RX ADMIN — LINAGLIPTIN 5 MG: 5 TABLET, FILM COATED ORAL at 08:27

## 2018-11-13 RX ADMIN — FAMOTIDINE 20 MG: 20 TABLET ORAL at 08:27

## 2018-11-13 RX ADMIN — TRAZODONE HYDROCHLORIDE 100 MG: 100 TABLET ORAL at 20:15

## 2018-11-13 RX ADMIN — SERTRALINE 100 MG: 100 TABLET, FILM COATED ORAL at 08:29

## 2018-11-13 RX ADMIN — NICOTINE POLACRILEX 2 MG: 2 LOZENGE ORAL at 18:18

## 2018-11-13 RX ADMIN — SERTRALINE 100 MG: 100 TABLET, FILM COATED ORAL at 09:16

## 2018-11-13 RX ADMIN — GLYBURIDE 5 MG: 5 TABLET ORAL at 18:17

## 2018-11-13 RX ADMIN — FAMOTIDINE 20 MG: 20 TABLET ORAL at 20:15

## 2018-11-13 RX ADMIN — GLYBURIDE 5 MG: 5 TABLET ORAL at 08:27

## 2018-11-13 RX ADMIN — QUETIAPINE FUMARATE 100 MG: 100 TABLET ORAL at 20:15

## 2018-11-13 RX ADMIN — ASPIRIN 81 MG 81 MG: 81 TABLET ORAL at 08:27

## 2018-11-13 NOTE — PLAN OF CARE
Problem: KNOWLEDGE DEFICIT  Goal: Knowledge - personal safety  Outcome: Ongoing  Patient did not complete personal safety plan this shift    Problem: Discharge Planning:  Goal: Discharged to appropriate level of care  Discharged to appropriate level of care   Outcome: Ongoing  Discharge planner working with patient to achieve optimal discharge plan, specific to the needs of this patient. Problem: Anger Management/Homicidal Ideation:  Goal: Able to display appropriate communication and problem solving  Able to display appropriate communication and problem solving   Outcome: Ongoing  Patient able to display appropriate communication and problem solving  Goal: Absence of homicidal ideation  Absence of homicidal ideation   Outcome: Ongoing  Patient has absence of homicidal ideation   Goal: Patient specific goal  Patient specific goal     Outcome: Ongoing  Patient did not set specific goal    Problem: Cardiac Output - Decreased:  Goal: Hemodynamic stability will improve  Hemodynamic stability will improve   Outcome: Ongoing  Patient still appears to be hemodynamically stable    Problem: Substance Abuse:  Goal: Patient specific goal  Patient specific goal     Outcome: Ongoing  Patient did not set specific goal  Goal: Participates in care planning  Participates in care planning   Outcome: Ongoing  Patient did not participate in care planning    Problem:  Activity:  Goal: Sleeping patterns will improve  Sleeping patterns will improve   Outcome: Ongoing  Patient sleeping pattern has improved

## 2018-11-13 NOTE — PLAN OF CARE
Problem: KNOWLEDGE DEFICIT  Goal: Knowledge - personal safety  Outcome: Ongoing  Pt did not work on safety plan this shift    Problem: Discharge Planning:  Goal: Discharged to appropriate level of care  Discharged to appropriate level of care   Outcome: Ongoing  Discharge planning is in progress    Problem: Anger Management/Homicidal Ideation:  Goal: Able to display appropriate communication and problem solving  Able to display appropriate communication and problem solving   Outcome: Ongoing  Pt is irritable and dismissive with staff, no peer interaction noted, flat affect, kept head down and did not look at nurse, stated \"i just want my bedtime medications and go to sleep\"  Goal: Absence of homicidal ideation  Absence of homicidal ideation   Outcome: Met This Shift  No homicidal thoughts  Goal: Patient specific goal  Patient specific goal     Outcome: Ongoing  No goal set today    Problem: Cardiac Output - Decreased:  Goal: Hemodynamic stability will improve  Hemodynamic stability will improve   Outcome: Ongoing  See vs flowsheet    Problem: Substance Abuse:  Goal: Patient specific goal  Patient specific goal     Outcome: Ongoing  No goal set today  Goal: Participates in care planning  Participates in care planning   Outcome: Ongoing      Problem: Activity:  Goal: Sleeping patterns will improve  Sleeping patterns will improve   Outcome: Ongoing  Reported pt slept 8.5 hours last night, pt had no c/o sleeplessness    Comments: Care plan reviewed with patient.   Patient does not verbalize understanding of the plan of care and does not contribute to goal setting

## 2018-11-14 PROCEDURE — 6370000000 HC RX 637 (ALT 250 FOR IP): Performed by: PSYCHIATRY & NEUROLOGY

## 2018-11-14 PROCEDURE — 1240000000 HC EMOTIONAL WELLNESS R&B

## 2018-11-14 RX ADMIN — LISINOPRIL 10 MG: 10 TABLET ORAL at 09:11

## 2018-11-14 RX ADMIN — TRAZODONE HYDROCHLORIDE 100 MG: 100 TABLET ORAL at 19:53

## 2018-11-14 RX ADMIN — FAMOTIDINE 20 MG: 20 TABLET ORAL at 09:14

## 2018-11-14 RX ADMIN — HYDROXYZINE PAMOATE 50 MG: 50 CAPSULE ORAL at 09:14

## 2018-11-14 RX ADMIN — GLYBURIDE 5 MG: 5 TABLET ORAL at 09:12

## 2018-11-14 RX ADMIN — LINAGLIPTIN 5 MG: 5 TABLET, FILM COATED ORAL at 09:12

## 2018-11-14 RX ADMIN — SERTRALINE 200 MG: 100 TABLET, FILM COATED ORAL at 09:11

## 2018-11-14 RX ADMIN — GLYBURIDE 5 MG: 5 TABLET ORAL at 18:38

## 2018-11-14 RX ADMIN — ASPIRIN 81 MG 81 MG: 81 TABLET ORAL at 09:14

## 2018-11-14 RX ADMIN — NICOTINE POLACRILEX 2 MG: 2 LOZENGE ORAL at 09:19

## 2018-11-14 RX ADMIN — FAMOTIDINE 20 MG: 20 TABLET ORAL at 19:53

## 2018-11-14 RX ADMIN — QUETIAPINE FUMARATE 100 MG: 100 TABLET ORAL at 19:54

## 2018-11-14 RX ADMIN — NICOTINE POLACRILEX 2 MG: 2 LOZENGE ORAL at 17:34

## 2018-11-14 RX ADMIN — NICOTINE POLACRILEX 2 MG: 2 LOZENGE ORAL at 11:31

## 2018-11-14 ASSESSMENT — PAIN SCALES - GENERAL: PAINLEVEL_OUTOF10: 0

## 2018-11-14 NOTE — PROGRESS NOTES
Daily Progress Note  Wilma Vinson MD  11/14/2018    Reviewed patient's current plan of care and vital signs with nursing staff. Sleep: 8.5 hours last night broken  Attending groups: Yes to some  No reported Suicidal or Homicidal thought; No interaction with peers & staff, less isolative to room, some hope & some support; Mood 9 on a scale of 1 to 10 with 10 is feeling normal; he would like to leave tomorrow night so he can drive to [place]. SUBJECTIVE:    Patient is feeling better. SUICIDAL IDEATION None. Patient does not have medication side effects. ROS: Patient has new complaints:  No  Sleeping adequately:  No  Visitors: No    Mental Status Examination: Patient is cooperative. Speech normal pitch and normal volume. No abnormal movements, tics or mannerisms. Mood Dysthymic, affect Restricted. Suicidal ideation Absent. Homicidal ideations Absent. Hallucinations Absent. Delusions Absent. Thought process Goal oriented. Alert and oriented X 3. Attention and concentration fair. MEMORY unable intact. Insight and Judgement Limited. Data   oral temperature is 98 °F (36.7 °C). His blood pressure is 130/78 and his pulse is 61. His respiration is 16 and oxygen saturation is 97%.      Medications  Current Facility-Administered Medications: sertraline (ZOLOFT) tablet 200 mg, 200 mg, Oral, Daily  nicotine polacrilex (COMMIT) lozenge 2 mg, 2 mg, Oral, PRN  traZODone (DESYREL) tablet 100 mg, 100 mg, Oral, Nightly PRN  aspirin chewable tablet 81 mg, 81 mg, Oral, Daily  famotidine (PEPCID) tablet 20 mg, 20 mg, Oral, BID  glyBURIDE (DIABETA) tablet 5 mg, 5 mg, Oral, BID WC  lisinopril (PRINIVIL;ZESTRIL) tablet 10 mg, 10 mg, Oral, Daily  linagliptin (TRADJENTA) tablet 5 mg, 5 mg, Oral, Daily  QUEtiapine (SEROQUEL) tablet 100 mg, 100 mg, Oral, Nightly  LORazepam (ATIVAN) tablet 1 mg, 1 mg, Oral, Q6H PRN **OR** LORazepam (ATIVAN) injection 1 mg, 1 mg, Intramuscular, Q6H PRN  acetaminophen (TYLENOL) tablet 650

## 2018-11-14 NOTE — PLAN OF CARE
Problem: KNOWLEDGE DEFICIT  Goal: Knowledge - personal safety  Outcome: Ongoing  Pt did not work on safety plan this shift    Problem: Discharge Planning:  Goal: Discharged to appropriate level of care  Discharged to appropriate level of care   Outcome: Ongoing  Pt plans to go to [facility] to his sister's at d/c    Problem: Anger Management/Homicidal Ideation:  Goal: Able to display appropriate communication and problem solving  Able to display appropriate communication and problem solving   Outcome: Ongoing    Goal: Absence of homicidal ideation  Absence of homicidal ideation   Outcome: Completed Date Met: 11/14/18  Pt denies homicidal thoughts  Goal: Patient specific goal  Patient specific goal     Outcome: Not Met This Shift  Pt made no goal today    Problem: Cardiac Output - Decreased:  Goal: Hemodynamic stability will improve  Hemodynamic stability will improve   Outcome: Met This Shift  See vs flowsheet    Problem: Substance Abuse:  Goal: Patient specific goal  Patient specific goal     Outcome: Not Met This Shift  Pt made no goal today  Goal: Participates in care planning  Participates in care planning   Outcome: Ongoing      Problem: Activity:  Goal: Sleeping patterns will improve  Sleeping patterns will improve   Outcome: Ongoing  Pt had trazodone at bedtime for sleep    Comments: Care plan reviewed with patient.   Patient does verbalize understanding of the plan of care and does contribute to goal setting

## 2018-11-15 VITALS
OXYGEN SATURATION: 97 % | RESPIRATION RATE: 16 BRPM | HEART RATE: 73 BPM | SYSTOLIC BLOOD PRESSURE: 104 MMHG | DIASTOLIC BLOOD PRESSURE: 59 MMHG | TEMPERATURE: 98.5 F

## 2018-11-15 PROCEDURE — 6370000000 HC RX 637 (ALT 250 FOR IP): Performed by: PSYCHIATRY & NEUROLOGY

## 2018-11-15 PROCEDURE — 5130000000 HC BRIDGE APPOINTMENT

## 2018-11-15 RX ORDER — TRAZODONE HYDROCHLORIDE 100 MG/1
100 TABLET ORAL NIGHTLY PRN
Qty: 30 TABLET | Refills: 0 | Status: SHIPPED | OUTPATIENT
Start: 2018-11-15

## 2018-11-15 RX ORDER — QUETIAPINE FUMARATE 100 MG/1
100 TABLET, FILM COATED ORAL NIGHTLY
Qty: 30 TABLET | Refills: 0 | Status: SHIPPED | OUTPATIENT
Start: 2018-11-15

## 2018-11-15 RX ORDER — SERTRALINE HYDROCHLORIDE 100 MG/1
200 TABLET, FILM COATED ORAL DAILY
Qty: 60 TABLET | Refills: 0 | Status: SHIPPED | OUTPATIENT
Start: 2018-11-15

## 2018-11-15 RX ORDER — LISINOPRIL 10 MG/1
10 TABLET ORAL DAILY
Qty: 30 TABLET | Refills: 0 | Status: SHIPPED | OUTPATIENT
Start: 2018-11-15

## 2018-11-15 RX ORDER — GLYBURIDE 5 MG/1
5 TABLET ORAL 2 TIMES DAILY WITH MEALS
Qty: 60 TABLET | Refills: 0 | Status: SHIPPED | OUTPATIENT
Start: 2018-11-15

## 2018-11-15 RX ORDER — FAMOTIDINE 20 MG/1
20 TABLET, FILM COATED ORAL 2 TIMES DAILY
Qty: 60 TABLET | Refills: 0 | Status: SHIPPED | OUTPATIENT
Start: 2018-11-15

## 2018-11-15 RX ORDER — HYDROXYZINE PAMOATE 50 MG/1
50 CAPSULE ORAL 3 TIMES DAILY PRN
Qty: 90 CAPSULE | Refills: 0 | Status: SHIPPED | OUTPATIENT
Start: 2018-11-15 | End: 2018-12-15

## 2018-11-15 RX ADMIN — GLYBURIDE 5 MG: 5 TABLET ORAL at 08:14

## 2018-11-15 RX ADMIN — LORAZEPAM 1 MG: 1 TABLET ORAL at 15:01

## 2018-11-15 RX ADMIN — GLYBURIDE 5 MG: 5 TABLET ORAL at 15:00

## 2018-11-15 RX ADMIN — FAMOTIDINE 20 MG: 20 TABLET ORAL at 08:14

## 2018-11-15 RX ADMIN — TRAZODONE HYDROCHLORIDE 100 MG: 100 TABLET ORAL at 12:19

## 2018-11-15 RX ADMIN — ASPIRIN 81 MG 81 MG: 81 TABLET ORAL at 08:14

## 2018-11-15 RX ADMIN — LISINOPRIL 10 MG: 10 TABLET ORAL at 08:14

## 2018-11-15 RX ADMIN — HYDROXYZINE PAMOATE 50 MG: 50 CAPSULE ORAL at 09:39

## 2018-11-15 RX ADMIN — SERTRALINE 200 MG: 100 TABLET, FILM COATED ORAL at 08:14

## 2018-11-15 RX ADMIN — LINAGLIPTIN 5 MG: 5 TABLET, FILM COATED ORAL at 08:14

## 2018-11-15 ASSESSMENT — PAIN SCALES - GENERAL: PAINLEVEL_OUTOF10: 0

## 2018-11-15 NOTE — PLAN OF CARE
Problem: KNOWLEDGE DEFICIT  Goal: Knowledge - personal safety  Outcome: Ongoing  Maintained in safe and secure environment. Patient did not fill out safety plan this so shift. Problem: Discharge Planning:  Goal: Discharged to appropriate level of care  Discharged to appropriate level of care   Outcome: Ongoing  Patient received discharge orders today, will be leaving around 2300 tonight due to driving [facility] [place] [facility].  is working on finding sisters address prior to discharge. Problem: Anger Management/Homicidal Ideation:  Goal: Patient specific goal  Patient specific goal     Outcome: Ongoing  Patient reports goal for today is to \"take one day at time\". Patient continues to work towards goal.      Problem: Cardiac Output - Decreased:  Goal: Hemodynamic stability will improve  Hemodynamic stability will improve   Outcome: Ongoing  Blood pressure 121/69 heat rate 78. Problem: Substance Abuse:  Goal: Patient specific goal  Patient specific goal     Outcome: Ongoing  Patient reports goal for today is to \"take one day at time\". Patient continues to work towards goal.    Goal: Participates in care planning  Participates in care planning   Outcome: Ongoing  Patient discussed treatment plan with physician/medical staff, attending group, and compliant with medications. Problem: Depressive Behavior With or Without Suicide Precautions:  Goal: Able to verbalize and/or display a decrease in depressive symptoms  Able to verbalize and/or display a decrease in depressive symptoms   Outcome: Ongoing  Patient reports mood as \"good\". Has flat affect, brightens with interaction. Speech clear. Good eye contact. Reports he has hope for future and identifies sister as his support system. Comments: Care plan reviewed with patient. Patient verbalize understanding of the plan of care and contribute to goal setting.

## 2018-11-15 NOTE — DISCHARGE SUMMARY
Physician Discharge Summary     Patient ID:  Hudson Trinh  [id number]  89 y.o.  1955    Admit date: 11/8/2018    Discharge date and time: 11/15/2018  7:53 AM     Admitting Physician: Kay Jane MD     Discharge Physician: Olga Gregg MD      Admission Diagnoses: Adjustment disorder [F43.20]  MDD (major depressive disorder), single episode, severe , no psychosis [facility] [F32.2]    IDENTIFYING INFORMATION: The patient is a 80-year-old  [facility]  American male. He has no children. He lives with his wife and his  stepson. He is unemployed. HISTORY OF PRESENT ILLNESS: The patient presented to [facility] due to homicidal thoughts. He found out reportedly that his wife  is sleeping with his stepson. He is very upset. In the emergency room,  he is threatening to kill his wife and his stepson. A duty to warn and  to protect was initiated in the emergency room.     The patient has been on this unit for three days, but has been extremely  angry and irritable. I was not able to assess him for the past three  days. He is taking only p.r.n. trazodone and hydroxyzine. Yesterday,  he became very agitated and was hitting things. I gave an order for  lorazepam.  Finally today, he decided to talk. He reports that he has  been very upset at his wife and his stepson. He says someone told him  that they were sleeping together, but he could not believe it. He says  he believed now they are sleeping together. He reports they are acting weird  By touching each other. He would like to go to [place] to one of his sisters. He denies currently any intent to hurt his wife or her son, but he is  still depressed and upset. He reports trouble sleeping at night. He  feels hopeless and worthless. He denies suicidal thoughts. Again, he  had homicidal thoughts upon admission, but not anymore.     He has some anger outbursts, but denies other symptoms of hypomania or  velia.   He denies psychotic

## 2018-11-15 NOTE — PROGRESS NOTES
PRN  hydrOXYzine (VISTARIL) capsule 50 mg, 50 mg, Oral, TID PRN  magnesium hydroxide (MILK OF MAGNESIA) 400 MG/5ML suspension 30 mL, 30 mL, Oral, Daily PRN    ASSESSMENT  MDD (major depressive disorder), recurrent severe, without psychosis [facility]     PLAN  Patient s symptoms are improving   Continue with current medications. Attempt to develop insight  Psycho-education conducted. Supportive Therapy conducted.   Probable discharge is today  Follow-up in [place]

## 2018-11-16 ENCOUNTER — NURSE TRIAGE (OUTPATIENT)
Dept: ADMINISTRATIVE | Age: 63
End: 2018-11-16

## 2018-11-16 NOTE — PROGRESS NOTES
CLINICAL PHARMACY NOTE: MEDS TO [employer] Select Patient?: No  Total # of Prescriptions Filled: 8   The following medications were delivered to the patient:  · Hydroxyzine pamoate 50 mg, trazodone 100 mg, sertraline 100 mg, tradjenta 5 mg, quetiapine 100 mg, lisinopril 5 mg, famotidine 20 mg, gluburide 5 mg  Total # of Interventions Completed: 4  Time Spent (min): 60    Additional Documentation:

## 2018-11-16 NOTE — DISCHARGE INSTR - COC
DOZ:[id number]}  Toileting  {CHP DME NAPY:782890826}  Feeding  {[facility] DME SAGM:880224950}  Med Admin  {P DME VKGP:982299495}  Med Delivery   { JESSIKA MED Delivery:787075971}    Wound Care Documentation and Therapy:        Elimination:  Continence:   · Bowel: {YES / HB:95757}  · Bladder: {YES / IC:44663}  Urinary Catheter: {Urinary Catheter:107659991}   Colostomy/Ileostomy/Ileal Conduit: {YES / JW:70158}       Date of Last BM: ***    Intake/Output Summary (Last 24 hours) at 11/15/18 2159  Last data filed at 11/15/18 1730   Gross per 24 hour   Intake             1080 ml   Output                0 ml   Net             1080 ml     I/O last 3 completed shifts:   In: 12 [P.O.:960]  Out: -     Safety Concerns:     [address] Safety Concerns:698370290}    Impairments/Disabilities:      [address] Impairments/Disabilities:679404502}    Nutrition Therapy:  Current Nutrition Therapy:   [address] Diet List:143887516}    Routes of Feeding: {[facility] DME Other Feedings:235535690}  Liquids: {Slp liquid thickness:53175}  Daily Fluid Restriction: {CHP DME Yes amt example:952287845}  Last Modified Barium Swallow with Video (Video Swallowing Test): {Done Not Done RSGW:987864662}    Treatments at the Time of Hospital Discharge:   Respiratory Treatments: ***  Oxygen Therapy:  {Therapy; copd oxygen:40882}  Ventilator:    { CC Vent IRDL:527457634}    Rehab Therapies: {THERAPEUTIC INTERVENTION:9260530413}  Weight Bearing Status/Restrictions: [employer] Weight Bearin}  Other Medical Equipment (for information only, NOT a DME order):  {EQUIPMENT:776607727}  Other Treatments: ***    Patient's personal belongings (please select all that are sent with patient):  {[facility] DME Belongings:085312611}    RN SIGNATURE:  {Esignature:142133297}    CASE MANAGEMENT/SOCIAL WORK SECTION    Inpatient Status Date: ***    Readmission Risk Assessment Score:  Readmission Risk              Risk of Unplanned Readmission:        9           Discharging to Facility/ Agency

## 2019-07-03 ENCOUNTER — HOSPITAL ENCOUNTER (OUTPATIENT)
Age: 64
Setting detail: SPECIMEN
Discharge: HOME | End: 2019-07-03
Payer: MEDICARE

## 2019-07-03 LAB
ABSOLUTE EOS #: 0.15 K/UL (ref 0–0.44)
ABSOLUTE IMMATURE GRANULOCYTE: <0.03 K/UL (ref 0–0.3)
ABSOLUTE LYMPH #: 2.25 K/UL (ref 1.1–3.7)
ABSOLUTE MONO #: 0.41 K/UL (ref 0.1–1.2)
ALBUMIN SERPL-MCNC: 3.7 G/DL (ref 3.5–5.2)
ALBUMIN/GLOBULIN RATIO: 1.2 (ref 1–2.5)
ALP BLD-CCNC: 52 U/L (ref 40–129)
ALT SERPL-CCNC: 5 U/L (ref 5–41)
ANION GAP SERPL CALCULATED.3IONS-SCNC: 13 MMOL/L (ref 9–17)
AST SERPL-CCNC: 8 U/L
BASOPHILS # BLD: 1 % (ref 0–2)
BASOPHILS ABSOLUTE: 0.03 K/UL (ref 0–0.2)
BILIRUB SERPL-MCNC: <0.1 MG/DL (ref 0.3–1.2)
BUN BLDV-MCNC: 19 MG/DL (ref 8–23)
BUN/CREAT BLD: ABNORMAL (ref 9–20)
CALCIUM SERPL-MCNC: 9.4 MG/DL (ref 8.6–10.4)
CHLORIDE BLD-SCNC: 100 MMOL/L (ref 98–107)
CO2: 25 MMOL/L (ref 20–31)
CREAT SERPL-MCNC: 1.19 MG/DL (ref 0.7–1.2)
DIFFERENTIAL TYPE: ABNORMAL
EOSINOPHILS RELATIVE PERCENT: 3 % (ref 1–4)
GFR AFRICAN AMERICAN: >60 ML/MIN
GFR NON-AFRICAN AMERICAN: >60 ML/MIN
GFR SERPL CREATININE-BSD FRML MDRD: ABNORMAL ML/MIN/{1.73_M2}
GFR SERPL CREATININE-BSD FRML MDRD: ABNORMAL ML/MIN/{1.73_M2}
GLUCOSE BLD-MCNC: 214 MG/DL (ref 70–99)
HAV IGM SER IA-ACNC: NONREACTIVE
HCT VFR BLD CALC: 41.8 % (ref 40.7–50.3)
HEMOGLOBIN: 12.5 G/DL (ref 13–17)
HEPATITIS B CORE IGM ANTIBODY: NONREACTIVE
HEPATITIS B SURFACE ANTIGEN: NONREACTIVE
HEPATITIS C ANTIBODY: NONREACTIVE
IMMATURE GRANULOCYTES: 0 %
LYMPHOCYTES # BLD: 46 % (ref 24–43)
MCH RBC QN AUTO: 27 PG (ref 25.2–33.5)
MCHC RBC AUTO-ENTMCNC: 29.9 G/DL (ref 28.4–34.8)
MCV RBC AUTO: 90.3 FL (ref 82.6–102.9)
MONOCYTES # BLD: 9 % (ref 3–12)
NRBC AUTOMATED: 0 PER 100 WBC
PDW BLD-RTO: 13.9 % (ref 11.8–14.4)
PLATELET # BLD: 210 K/UL (ref 138–453)
PLATELET ESTIMATE: ABNORMAL
PMV BLD AUTO: 11 FL (ref 8.1–13.5)
POTASSIUM SERPL-SCNC: 4.4 MMOL/L (ref 3.7–5.3)
RBC # BLD: 4.63 M/UL (ref 4.21–5.77)
RBC # BLD: ABNORMAL 10*6/UL
SEG NEUTROPHILS: 41 % (ref 36–65)
SEGMENTED NEUTROPHILS ABSOLUTE COUNT: 1.94 K/UL (ref 1.5–8.1)
SODIUM BLD-SCNC: 138 MMOL/L (ref 135–144)
TOTAL PROTEIN: 6.9 G/DL (ref 6.4–8.3)
WBC # BLD: 4.8 K/UL (ref 3.5–11.3)
WBC # BLD: ABNORMAL 10*3/UL

## 2019-07-04 LAB — HIV AG/AB: NONREACTIVE

## 2019-07-05 LAB
-: NORMAL
REASON FOR REJECTION: NORMAL
ZZ NTE CLEAN UP: ORDERED TEST: NORMAL
ZZ NTE WITH NAME CLEAN UP: SPECIMEN SOURCE: NORMAL